# Patient Record
Sex: FEMALE | Race: WHITE | NOT HISPANIC OR LATINO | Employment: FULL TIME | URBAN - NONMETROPOLITAN AREA
[De-identification: names, ages, dates, MRNs, and addresses within clinical notes are randomized per-mention and may not be internally consistent; named-entity substitution may affect disease eponyms.]

---

## 2020-02-26 ENCOUNTER — OFFICE VISIT CONVERTED (OUTPATIENT)
Dept: FAMILY MEDICINE CLINIC | Age: 35
End: 2020-02-26
Attending: NURSE PRACTITIONER

## 2020-02-26 ENCOUNTER — HOSPITAL ENCOUNTER (OUTPATIENT)
Dept: OTHER | Facility: HOSPITAL | Age: 35
Discharge: HOME | End: 2020-02-26
Attending: NURSE PRACTITIONER

## 2020-02-26 LAB
25(OH)D3 SERPL-MCNC: 57.5 NG/ML (ref 30–100)
ALBUMIN SERPL-MCNC: 4.2 G/DL (ref 3.5–5)
ALBUMIN/GLOB SERPL: 1.8 {RATIO} (ref 1.4–2.6)
ALP SERPL-CCNC: 43 U/L (ref 42–98)
ALT SERPL-CCNC: 13 U/L (ref 10–40)
ANION GAP SERPL CALC-SCNC: 21 MMOL/L (ref 8–19)
AST SERPL-CCNC: 18 U/L (ref 15–50)
BASOPHILS # BLD MANUAL: 0.04 10*3/UL (ref 0–0.2)
BASOPHILS NFR BLD MANUAL: 0.7 % (ref 0–3)
BILIRUB SERPL-MCNC: 0.34 MG/DL (ref 0.2–1.3)
BUN SERPL-MCNC: 7 MG/DL (ref 5–25)
BUN/CREAT SERPL: 10 {RATIO} (ref 6–20)
CALCIUM SERPL-MCNC: 9.3 MG/DL (ref 8.7–10.4)
CHLORIDE SERPL-SCNC: 104 MMOL/L (ref 99–111)
CHOLEST SERPL-MCNC: 161 MG/DL (ref 107–200)
CHOLEST/HDLC SERPL: 2.6 {RATIO} (ref 3–6)
CONV CO2: 21 MMOL/L (ref 22–32)
CONV TOTAL PROTEIN: 6.5 G/DL (ref 6.3–8.2)
CREAT UR-MCNC: 0.68 MG/DL (ref 0.5–0.9)
DEPRECATED RDW RBC AUTO: 39.4 FL
EOSINOPHIL # BLD MANUAL: 0.18 10*3/UL (ref 0–0.7)
EOSINOPHIL NFR BLD MANUAL: 3.3 % (ref 0–7)
ERYTHROCYTE [DISTWIDTH] IN BLOOD BY AUTOMATED COUNT: 12 % (ref 11.5–14.5)
GFR SERPLBLD BASED ON 1.73 SQ M-ARVRAT: >60 ML/MIN/{1.73_M2}
GLOBULIN UR ELPH-MCNC: 2.3 G/DL (ref 2–3.5)
GLUCOSE SERPL-MCNC: 84 MG/DL (ref 65–99)
GRANS (ABSOLUTE): 3.48 10*3/UL (ref 2–8)
GRANS: 64.7 % (ref 30–85)
HBA1C MFR BLD: 12 G/DL (ref 12–16)
HCT VFR BLD AUTO: 35 % (ref 37–47)
HDLC SERPL-MCNC: 61 MG/DL (ref 40–60)
IMM GRANULOCYTES # BLD: 0.01 10*3/UL (ref 0–0.54)
IMM GRANULOCYTES NFR BLD: 0.2 % (ref 0–0.43)
LDLC SERPL CALC-MCNC: 88 MG/DL (ref 70–100)
LYMPHOCYTES # BLD MANUAL: 1.36 10*3/UL (ref 1–5)
LYMPHOCYTES NFR BLD MANUAL: 5.9 % (ref 3–10)
MCH RBC QN AUTO: 30.6 PG (ref 27–31)
MCHC RBC AUTO-ENTMCNC: 34.3 G/DL (ref 33–37)
MCV RBC AUTO: 89.3 FL (ref 81–99)
MONOCYTES # BLD AUTO: 0.32 10*3/UL (ref 0.2–1.2)
OSMOLALITY SERPL CALC.SUM OF ELEC: 291 MOSM/KG (ref 273–304)
PLATELET # BLD AUTO: 199 10*3/UL (ref 130–400)
PMV BLD AUTO: 10 FL (ref 7.4–10.4)
POTASSIUM SERPL-SCNC: 4.4 MMOL/L (ref 3.5–5.3)
RBC # BLD AUTO: 3.92 10*6/UL (ref 4.2–5.4)
SODIUM SERPL-SCNC: 142 MMOL/L (ref 135–147)
TRIGL SERPL-MCNC: 60 MG/DL (ref 40–150)
TSH SERPL-ACNC: 1.2 M[IU]/L (ref 0.27–4.2)
VARIANT LYMPHS NFR BLD MANUAL: 25.2 % (ref 20–45)
VLDLC SERPL-MCNC: 12 MG/DL (ref 5–37)
WBC # BLD AUTO: 5.39 10*3/UL (ref 4.8–10.8)

## 2021-05-18 NOTE — PROGRESS NOTES
Sandra Bolaños 1985     Preventive Medicine Services    Visit Date: Wed, Feb 26, 2020 8:33 am    Provider: Viktoria Castillo N.P. (Assistant: Lesly Sierra MA )    Location:         Electronically signed by Viktoria Castillo N.P. on  02/26/2020 09:14:17 AM                             Subjective:        CC: Mrs. Bolaños is a 34 year old White female.  This is her first visit to the clinic.  well check;         HPI:           Encounter for general adult medical examination without abnormal findings noted.  Her last physical exam was several years ago.  Her last menstrual period was 2/17/2020.  She performs breast self-exams occasionally.  She is current with her influenza.  She is not current with Td immunization.            With regard to the dysthymic disorder, her anxiety disorder was originally diagnosed many years ago.  reports that she has struggled with anxiety and depression for some time - currnetly trying lifestyle changes - she does have buspirone that she is able to take PRN - but reports that she does not 'feel right' when she takes so takes very sparingly  Satisfied with the current situation and does not wish to have further treatment       History of Vitamin D deficiency,  does not currently take supplements          With regard to the anemia, unspecified, the patient has known they are anemic for several months ago.  The patients has iron deficiency anemia.  History of anemia - has been unable to donate blood due to level.  She does have extremely heavy periods for 3/5 days  has not checked levels in some time     ROS:     CONSTITUTIONAL:  Negative for chills, fatigue and fever.      EYES:  Positive for use of glasses and contact lenses.   Negative for blurred vision.      E/N/T:  Negative for diminished hearing and sinus pressure.      CARDIOVASCULAR:  Negative for chest pain and pedal edema.      RESPIRATORY:  Negative for recent cough, dyspnea and frequent wheezing.      GASTROINTESTINAL:   Positive for constipation.   Negative for abdominal pain, diarrhea, nausea or vomiting.      GENITOURINARY:  Negative for dysuria and change in urine stream.      MUSCULOSKELETAL:  Negative for arthralgias, back pain, joint stiffness and myalgias.      INTEGUMENTARY/BREAST:  Negative for atypical mole(s) and rash.      NEUROLOGICAL:  Positive for headaches ( controlled on OTC ).      ENDOCRINE:  Negative for hair loss, polydipsia and polyphagia.      ALLERGIC/IMMUNOLOGIC:  Positive for seasonal allergies.      PSYCHIATRIC:  Positive for anxiety and wake up around 3 after bed at 8  then able to fall back to sleep.   Negative for depression, feelings of stress, recreational drug use or suicidal thoughts.          Past Medical History / Family History / Social History:         Last Reviewed on 2020 08:45 AM by Viktoria Castillo    Past Medical History:                 PAST MEDICAL HISTORY             GYNECOLOGICAL HISTORY:        Contraception: partner is S/P vasectomy;         PREVENTIVE HEALTH MAINTENANCE             DENTAL CLEANING: was last done  - [place]     EYE EXAM: was last done more than 2 years     MAMMOGRAM: has never been done and has no medical need for one at this time     PAP SMEAR: was last done  -  with normal results [facility]         Surgical History:     Other Surgeries    Dilation and evacuation ; Procedures: colonoscopy /Dr. Blakely normal         Family History:     Father: Hypertension;  Type 2 Diabetes     Mother: Anxiety; Depression     Sister(s): 1 sister(s) total;  Depression         Social History:     Occupation: [employer]      Marital Status:      Children: 1 child         Tobacco/Alcohol/Supplements:     Last Reviewed on 2020 08:45 AM by Viktoria Castillo    Tobacco: (15 yrs pack-year history).          Alcohol: Frequency:    RARE;     Caffeine:  She admits to consuming caffeine via coffee ( 1 serving per day ).           Substance Abuse History:     Last Reviewed on 2/26/2020 08:45 AM by Viktoria Castillo    None         Mental Health History:     Last Reviewed on 2/26/2020 08:45 AM by Viktoria Castillo        Generalized Anxiety Disorder         Communicable Diseases (eg STDs):     Last Reviewed on 2/26/2020 08:45 AM by Viktoria Castillo    Reportable health conditions; NEGATIVE         Current Problems:     Last Reviewed on 2/26/2020 08:45 AM by Viktoria Castillo    Dysthymic disorder    Vitamin D deficiency, unspecified    Anemia, unspecified    Encounter for general adult medical examination without abnormal findings        Immunizations:     zzFluzone pf-quadrivalent 3 and up 10/1/2016    Fluzone (3 + years dose) 9/27/2017    influenza, injectable, quadrivalent 10/1/2019        Allergies:     Last Reviewed on 2/26/2020 08:45 AM by Viktoria Castillo    Bactrim:          Current Medications:     Last Reviewed on 2/26/2020 08:45 AM by Viktoria Castillo    Multivitamins     busPIRone 10 mg oral tablet [take 1 tablet (10 mg) by oral route PRN ]        Objective:        Vitals:         Current: 2/26/2020 8:39:37 AM    Ht:  5 ft, 5.5 in;  Wt: 131.8 lbs;  BMI: 21.6T: 98.8 F (oral);  BP: 117/75 mm Hg (left arm, sitting);  P: 77 bpm (left arm (BP Cuff), sitting);  sCr: 0.79 mg/dL;  GFR: 94.35        Exams:     PHYSICAL EXAM:     GENERAL: vital signs recorded - well developed, well nourished;  no apparent distress;     EYES: extraocular movements intact; PERRL;     E/N/T: EARS: external auditory canal normal;  both TMs are scarred;  NOSE:  normal nasal mucosa, septum, turbinates, and sinuses; OROPHARYNX:  normal mucosa, dentition, gingiva, and posterior pharynx;     NECK: range of motion is normal;     RESPIRATORY: normal appearance and symmetric expansion of chest wall; normal respiratory rate and pattern with no distress; normal breath sounds with no rales, rhonchi, wheezes or rubs;     CARDIOVASCULAR: normal rate; rhythm is  regular;  no edema;     GASTROINTESTINAL: nontender; normal bowel sounds; no organomegaly;     LYMPHATIC: no enlargement of cervical or facial nodes; no supraclavicular nodes;     MUSCULOSKELETAL: normal gait; normal range of motion of all major muscle groups; no limb or joint pain with range of motion;     NEUROLOGIC: mental status: alert and oriented x 3;     PSYCHIATRIC: appropriate affect and demeanor; normal speech pattern; normal thought and perception;         Assessment:         Z00.00   Encounter for general adult medical examination without abnormal findings       F34.1   Dysthymic disorder       E55.9   Vitamin D deficiency, unspecified       D64.9   Anemia, unspecified           ORDERS:         Lab Orders:       [id number]  VITD - [facility] Vitamin D, 25 Hydroxy  (Send-Out)            [id number]  [facility] PHYSICAL: CMP, CBC, TSH, LIPID: [id number], [id number]  (Send-Out)              Other Orders:         Depression screen negative  (In-House)                      Plan:         Encounter for general adult medical examination without abnormal findings    LABORATORY:  Labs ordered to be performed today include PHYSICAL PANEL; CMP, CBC, TSH, LIPID.      RECOMMENDATIONS given include: recommend annual eye exam - we will get labs.  MIPS PHQ-9 Depression Screening: Completed form scanned and in chart; Total Score 2; Negative Depression Screen           Orders:       [id number]  [facility] PHYSICAL: CMP, CBC, TSH, LIPID: [id number], [id number]  (Send-Out)              Depression screen negative  (In-House)              Dysthymic disorderShe will continue to take the buspirone PRN and wishes to discontinue eventually - will follow up PRN        Vitamin D deficiency, unspecified    LABORATORY:  Labs ordered to be performed today include Vitamin D.            Orders:       [id number]  VITD - [facility] Vitamin D, 25 Hydroxy  (Send-Out)              Anemia, unspecifiedWill check CBC - may need to add on iron panel             Patient Recommendations:        For  Encounter for general adult medical examination without abnormal findings:    I also recommend recommend annual eye exam - we will get labs.              Charge Capture:         Primary Diagnosis:     Z00.00  Encounter for general adult medical examination without abnormal findings           Orders:      [id number]  Preventive medicine, established patient, age 18-39 years  (In-House)              Depression screen negative  (In-House)              F34.1  Dysthymic disorder     E55.9  Vitamin D deficiency, unspecified     D64.9  Anemia, unspecified

## 2021-07-02 VITALS
HEART RATE: 77 BPM | TEMPERATURE: 98.8 F | DIASTOLIC BLOOD PRESSURE: 75 MMHG | BODY MASS INDEX: 21.18 KG/M2 | WEIGHT: 131.8 LBS | HEIGHT: 66 IN | SYSTOLIC BLOOD PRESSURE: 117 MMHG

## 2021-07-12 ENCOUNTER — OFFICE VISIT (OUTPATIENT)
Dept: FAMILY MEDICINE CLINIC | Age: 36
End: 2021-07-12

## 2021-07-12 VITALS
HEART RATE: 80 BPM | BODY MASS INDEX: 20.56 KG/M2 | DIASTOLIC BLOOD PRESSURE: 65 MMHG | SYSTOLIC BLOOD PRESSURE: 113 MMHG | HEIGHT: 67 IN | WEIGHT: 131 LBS

## 2021-07-12 DIAGNOSIS — J06.9 ACUTE URI: Primary | ICD-10-CM

## 2021-07-12 PROCEDURE — 99213 OFFICE O/P EST LOW 20 MIN: CPT | Performed by: NURSE PRACTITIONER

## 2021-07-12 RX ORDER — MULTIPLE VITAMINS W/ MINERALS TAB 9MG-400MCG
1 TAB ORAL DAILY
COMMUNITY

## 2021-07-12 RX ORDER — FLUTICASONE PROPIONATE 50 MCG
2 SPRAY, SUSPENSION (ML) NASAL DAILY
Qty: 15.8 ML | Refills: 1 | Status: SHIPPED | OUTPATIENT
Start: 2021-07-12 | End: 2021-09-30

## 2021-07-12 RX ORDER — CETIRIZINE HYDROCHLORIDE 10 MG/1
10 TABLET ORAL DAILY
Qty: 30 TABLET | Refills: 1 | Status: SHIPPED | OUTPATIENT
Start: 2021-07-12 | End: 2022-09-12

## 2021-07-12 NOTE — PATIENT INSTRUCTIONS

## 2021-07-12 NOTE — PROGRESS NOTES
"Chief Complaint  Nasal Congestion and Headache    Subjective          Sandra Bolaños presents to [facility]  Upper Respiratory Infection  Patient complains of symptoms of a URI. Symptoms include cough described as nonproductive, headache described as pressure-like, nasal congestion, no  fever, sneezing and sore throat. Onset of symptoms was 7 days ago, and has been rapidly worsening since that time. Treatment to date: antihistamines and Dayquil/Nightquil and [facility] [place].  No sick contacts.  Have been vaccinated against COVID.  Denies N/V.                      Objective   Vital Signs:   /65 (BP Location: Right arm, Patient Position: Sitting)   Pulse 80   Ht 170.2 cm (67\")   Wt 59.4 kg (131 lb)   BMI 20.52 kg/m²     Physical Exam  Constitutional:       Appearance: Normal appearance. She is normal weight.   HENT:      Head: Normocephalic.      Comments: Slight right frontal sinus tenderness     Right Ear: Tympanic membrane, ear canal and external ear normal.      Left Ear: Tympanic membrane, ear canal and external ear normal.      Nose: Nose normal.      Mouth/Throat:      Mouth: Mucous membranes are moist.      Pharynx: Oropharynx is clear. No oropharyngeal exudate or posterior oropharyngeal erythema.   Eyes:      Conjunctiva/sclera: Conjunctivae normal.      Pupils: Pupils are equal, round, and reactive to light.   Cardiovascular:      Rate and Rhythm: Normal rate and regular rhythm.      Pulses: Normal pulses.      Heart sounds: Normal heart sounds.   Pulmonary:      Effort: Pulmonary effort is normal.      Breath sounds: Normal breath sounds.   Musculoskeletal:      Cervical back: Normal range of motion.   Neurological:      Mental Status: She is alert and oriented to person, place, and time.   Psychiatric:         Mood and Affect: Mood normal.         Behavior: Behavior normal.         Thought Content: Thought content normal.        Result Review :               "   Assessment and Plan    Diagnoses and all orders for this visit:    1. Acute URI (Primary)  -     fluticasone (Flonase) 50 MCG/ACT nasal spray; 2 sprays into the nostril(s) as directed by provider Daily.  Dispense: 15.8 mL; Refill: 1  -     cetirizine (zyrTEC) 10 MG tablet; Take 1 tablet by mouth Daily for 30 days.  Dispense: 30 tablet; Refill: 1    Her symptoms tend to point towards allergic rhinitis, but she does have some slight right frontal sinus tenderness and headaches.  We will try conservative treatment, and if she doesn't improve, we may consider a antibiotic.    Follow Up   Return if symptoms worsen or fail to improve.  Patient was given instructions and counseling regarding her condition or for health maintenance advice. Please see specific information pulled into the AVS if appropriate.

## 2021-09-30 DIAGNOSIS — J06.9 ACUTE URI: ICD-10-CM

## 2021-09-30 RX ORDER — FLUTICASONE PROPIONATE 50 MCG
SPRAY, SUSPENSION (ML) NASAL
Qty: 16 G | Refills: 1 | Status: SHIPPED | OUTPATIENT
Start: 2021-09-30

## 2022-09-12 ENCOUNTER — OFFICE VISIT (OUTPATIENT)
Dept: FAMILY MEDICINE CLINIC | Age: 37
End: 2022-09-12

## 2022-09-12 ENCOUNTER — HOSPITAL ENCOUNTER (OUTPATIENT)
Dept: GENERAL RADIOLOGY | Facility: HOSPITAL | Age: 37
Discharge: HOME | End: 2022-09-12

## 2022-09-12 VITALS
HEART RATE: 73 BPM | TEMPERATURE: 98.3 F | BODY MASS INDEX: 22.29 KG/M2 | SYSTOLIC BLOOD PRESSURE: 123 MMHG | WEIGHT: 142 LBS | DIASTOLIC BLOOD PRESSURE: 85 MMHG | HEIGHT: 67 IN

## 2022-09-12 DIAGNOSIS — M54.41 ACUTE MIDLINE LOW BACK PAIN WITH RIGHT-SIDED SCIATICA: ICD-10-CM

## 2022-09-12 DIAGNOSIS — Q76.0 SPINA BIFIDA OCCULTA: ICD-10-CM

## 2022-09-12 DIAGNOSIS — M54.41 ACUTE MIDLINE LOW BACK PAIN WITH RIGHT-SIDED SCIATICA: Primary | ICD-10-CM

## 2022-09-12 PROCEDURE — 99213 OFFICE O/P EST LOW 20 MIN: CPT | Performed by: PHYSICIAN ASSISTANT

## 2022-09-12 PROCEDURE — 96372 THER/PROPH/DIAG INJ SC/IM: CPT | Performed by: PHYSICIAN ASSISTANT

## 2022-09-12 PROCEDURE — 72100 X-RAY EXAM L-S SPINE 2/3 VWS: CPT

## 2022-09-12 RX ORDER — KETOROLAC TROMETHAMINE 30 MG/ML
30 INJECTION, SOLUTION INTRAMUSCULAR; INTRAVENOUS ONCE
Status: COMPLETED | OUTPATIENT
Start: 2022-09-12 | End: 2022-09-12

## 2022-09-12 RX ORDER — CYCLOBENZAPRINE HCL 5 MG
5 TABLET ORAL 2 TIMES DAILY PRN
Qty: 6 TABLET | Refills: 0 | Status: SHIPPED | OUTPATIENT
Start: 2022-09-12 | End: 2022-09-15

## 2022-09-12 RX ADMIN — KETOROLAC TROMETHAMINE 30 MG: 30 INJECTION, SOLUTION INTRAMUSCULAR; INTRAVENOUS at 12:37

## 2022-09-12 NOTE — PROGRESS NOTES
Subjective     CHIEF COMPLAINT    Chief Complaint   Patient presents with   • Back Pain     Patient said it went out while squatting down and twisting            This is a 36-year-old female presenting to the clinic complaining of back pain since yesterday.  She was bending down to feed her cat, twisted to the left and had sudden onset of right-sided back pain.  She has difficulty with movement secondary to this pain but does not feel numbness, tingling, or incontinence.  She has had similar symptoms in the past and has seen chiropractors before for this.  Most recently, about 1 year ago, she states that a chiropractor told her she had spina bifida occulta after looking at her x-ray.  She has been trying ice and TENS unit for the episode today which has improved her pain somewhat.  She is mainly wondering if there is something more insidious going on especially with the questionable spina bifida and/or if there are things she could be doing to prevent these episodes.    Back Pain  This is a recurrent problem. The current episode started yesterday. The problem occurs rarely. The problem has been gradually improving since onset. The pain is present in the lumbar spine. The quality of the pain is described as stabbing and aching. The pain radiates to the right thigh. The pain is moderate. The symptoms are aggravated by lying down, sitting and standing. Pertinent negatives include no bladder incontinence, bowel incontinence, dysuria, fever, numbness, paresis, paresthesias, perianal numbness, tingling, weakness or weight loss. She has tried ice, chiropractic manipulation and NSAIDs (TENS) for the symptoms. The treatment provided moderate relief.             Review of Systems   Constitutional: Negative for chills, fever and weight loss.   Gastrointestinal: Negative for bowel incontinence.   Genitourinary: Negative for bladder incontinence, difficulty urinating and dysuria.   Musculoskeletal: Positive for back pain.  "Negative for arthralgias, gait problem, joint swelling and myalgias.   Skin: Negative for wound.   Neurological: Negative for tingling, weakness, numbness and paresthesias.            History reviewed. No pertinent past medical history.         History reviewed. No pertinent surgical history.         History reviewed. No pertinent family history.         Social History     Socioeconomic History   • Marital status:    Tobacco Use   • Smoking status: Never Smoker   • Smokeless tobacco: Never Used   Substance and Sexual Activity   • Alcohol use: Yes     Comment: socailly            Allergies   Allergen Reactions   • Bactrim [Sulfamethoxazole-Trimethoprim] Hives            Current Outpatient Medications on File Prior to Visit   Medication Sig Dispense Refill   • cetirizine (zyrTEC) 10 MG tablet Take 1 tablet by mouth Daily for 30 days. 30 tablet 1   • fluticasone (FLONASE) 50 MCG/ACT nasal spray USE 2 SPRAYS IN EACH NOSTRIL EVERY DAY 16 g 1   • multivitamin with minerals tablet tablet Take 1 tablet by mouth Daily.       No current facility-administered medications on file prior to visit.            /85 (BP Location: Left arm, Patient Position: Sitting)   Pulse 73   Temp 98.3 °F (36.8 °C) (Oral)   Ht 170.2 cm (67\")   Wt 64.4 kg (142 lb)   BMI 22.24 kg/m²          Objective     Physical Exam  Vitals and nursing note reviewed.   Constitutional:       General: She is not in acute distress.     Appearance: Normal appearance.   Pulmonary:      Effort: Pulmonary effort is normal. No respiratory distress.   Musculoskeletal:      Lumbar back: No swelling, deformity, spasms, tenderness or bony tenderness. Decreased range of motion.   Skin:     General: Skin is warm and dry.      Findings: No bruising or erythema.   Neurological:      Mental Status: She is alert and oriented to person, place, and time.      Sensory: Sensation is intact.      Motor: Weakness (4/5 L hip flexion; all other LE movements 5/5) present. " No atrophy.      Gait: Gait is intact.      Deep Tendon Reflexes:      Reflex Scores:       Patellar reflexes are 2+ on the right side and 2+ on the left side.  Psychiatric:         Mood and Affect: Mood normal.         Behavior: Behavior normal.              Procedures                    Lab Results (last 24 hours)     ** No results found for the last 24 hours. **                XR Spine Lumbar 2 or 3 View    Result Date: 9/12/2022  PROCEDURE: XR SPINE LUMBAR 2 OR 3 VW  COMPARISON: [facility] ALTAGRACIA JONES, LS-SPINE COMPL W/OBLIQUE, 6/02/2014, 12:51.  INDICATIONS: LOW BACK PAIN  FINDINGS:  No fracture of the lumbar spine.  No significant malalignment is seen.  SI joints appear unremarkable.  Normal bowel gas pattern is seen.  Stable spina bifida occulta L5 of doubtful clinical significance and unchanged compared to 6/2/2014.        1. No acute osseous abnormality of the lumbar spine.     ROHITH WINKLER MD       Electronically Signed and Approved By: ROHITH WINKLER MD on 9/12/2022 at 14:43                               Diagnoses and all orders for this visit:    1. Acute midline low back pain with right-sided sciatica (Primary)  -     XR Spine Lumbar 2 or 3 View; Future  -     Ambulatory Referral to Physical Therapy Evaluate and treat  -     cyclobenzaprine (FLEXERIL) 5 MG tablet; Take 1 tablet by mouth 2 (Two) Times a Day As Needed for Muscle Spasms for up to 3 days.  Dispense: 6 tablet; Refill: 0  -     ketorolac (TORADOL) injection 30 mg    2. Spina bifida occulta  Comments:  Treatment as above, does not seem like surgical candidate.  Offered referral to spine specialist should patient wish for second opinion.             Additional Instructions for the Follow-ups that You Need to Schedule     Ambulatory Referral to Physical Therapy Evaluate and treat   As directed      Specialty needed: Evaluate and treat    Follow-up needed: Yes                         FOR FULL DISCHARGE  INSTRUCTIONS/COMMENTS/HANDOUTS please see the AVS

## 2024-03-01 NOTE — PROGRESS NOTES
"Chief Complaint  Annual Exam (Discuss anxiety medication.)    Subjective          Sandra Bolaños presents to [facility]  History of Present Illness  She is here for a physical.    She was pregnant in 2018 and possibly had the tetanus at that time.  She has had 2 doses of the COVID-vaccine; she's not interested in the booster at this time. Last PAP 03/21/2023 through [facility]. She does go to Dr. Jeff for her denistry. She does wear her seat belt. She is physically active, but not currently due to dizziness. She drinks a lot of water daily.     She has been having dizzy spells ever since she was sick a few weeks ago.  She saw Brissa Delgadillo, who recommended further evaluation to the ED.  She went to the ED and was given IV fluids and it was found that her potassium was low.  She was sent in a potassium supplement, but has not been able to pick it up yet.  She was also given a prescription of meclizine, which she has not been able to .  Her ED workup did include EKG and orthostatic blood pressure, which she reports was normal.  The ED physician believed her dizziness may have been to BPPV . She reports getting hot with these spells. She reports feeling pre-syncope.  She has been having a lot of anxiety since the spells have taken place.  She would to start medication for anxiety.  She was on Wellbutrin in the past, which was not helpful.            Objective   Vital Signs:   /63 (BP Location: Left arm, Patient Position: Sitting)   Pulse 89   Ht 170.2 cm (67.01\")   Wt 65.6 kg (144 lb 9.6 oz)   BMI 22.64 kg/m²     Physical Exam  Constitutional:       General: She is not in acute distress.     Appearance: Normal appearance. She is well-developed and normal weight.   HENT:      Head: Normocephalic.      Right Ear: Tympanic membrane, ear canal and external ear normal.      Left Ear: Tympanic membrane, ear canal and external ear normal. A middle ear effusion is " present. Tympanic membrane is scarred.      Mouth/Throat:      Mouth: Mucous membranes are moist.      Pharynx: Oropharynx is clear. No oropharyngeal exudate or posterior oropharyngeal erythema.   Eyes:      Extraocular Movements: Extraocular movements intact.      Conjunctiva/sclera: Conjunctivae normal.      Pupils: Pupils are equal, round, and reactive to light.      Visual Fields: Right eye visual fields normal and left eye visual fields normal.   Neck:      Thyroid: No thyromegaly or thyroid tenderness.   Cardiovascular:      Rate and Rhythm: Normal rate and regular rhythm.      Heart sounds: Normal heart sounds.   Pulmonary:      Effort: Pulmonary effort is normal. No retractions.      Breath sounds: Normal breath sounds and air entry.   Abdominal:      General: Abdomen is flat. Bowel sounds are normal. There is no distension.      Palpations: Abdomen is soft. There is no mass.      Tenderness: There is no abdominal tenderness.   Musculoskeletal:         General: Normal range of motion.      Cervical back: Normal range of motion and neck supple.      Right lower leg: No edema.      Left lower leg: No edema.   Lymphadenopathy:      Cervical: No cervical adenopathy.   Skin:     General: Skin is warm and dry.   Neurological:      Mental Status: She is alert and oriented to person, place, and time.      Gait: Gait normal.   Psychiatric:         Mood and Affect: Mood and affect normal.         Behavior: Behavior normal.         Thought Content: Thought content normal.        Result Review :   The following data was reviewed by: COURT Pablo on 03/08/2024:  Urinalysis, Reflex Microscopic and Culture If Indicated (03/06/2024 16:22)  MAGNESIUM (03/06/2024 16:22)  COMPREHENSIVE METABOLIC PANEL (03/06/2024 16:22)  CBC with Auto Diff (03/06/2024 16:22)  Pregnancy, Urine - (03/06/2024 16:23)  T4 (03/06/2024 16:23)  TSH (03/06/2024 16:23)  High Sensitivity Troponin I (03/06/2024 16:23)                 Assessment  and Plan    Diagnoses and all orders for this visit:    1. Annual physical exam (Primary)  -     Comprehensive Metabolic Panel; Future  -     Lipid Panel; Future  -     Hemoglobin A1c; Future  -     Hepatitis C Antibody; Future    2. Preventative health care  -     Comprehensive Metabolic Panel; Future  -     Lipid Panel; Future  -     Hemoglobin A1c; Future  -     Hepatitis C Antibody; Future    3. Screening for diabetes mellitus (DM)  -     Hemoglobin A1c; Future    4. Screening for cholesterol level  -     Lipid Panel; Future    5. Need for hepatitis C screening test  -     Hepatitis C Antibody; Future    6. Anxiety  Assessment & Plan:  We have discussed various options for anxiety including SSRIs versus buspirone.  She is interested in starting buspirone.  Will reevaluate her in 6 weeks to see if buspirone was effective.    Orders:  -     busPIRone (BUSPAR) 5 MG tablet; Take 1 tablet by mouth 3 (Three) Times a Day.  Dispense: 90 tablet; Refill: 1    7. Encounter for screening mammogram for malignant neoplasm of breast  -     Mammo Screening Digital Tomosynthesis Bilateral With CAD; Future    8. Dizziness  Comments:  She's going to  her meclizine today. Eply maneuver handouts given. If no improvement, may consider ENT referral.          Follow Up   Return in about 6 weeks (around 4/19/2024).  Patient was given instructions and counseling regarding her condition or for health maintenance advice. Please see specific information pulled into the AVS if appropriate.

## 2024-03-06 ENCOUNTER — OFFICE VISIT (OUTPATIENT)
Dept: FAMILY MEDICINE CLINIC | Age: 39
End: 2024-03-06
Payer: COMMERCIAL

## 2024-03-06 VITALS
HEART RATE: 93 BPM | HEIGHT: 67 IN | SYSTOLIC BLOOD PRESSURE: 130 MMHG | TEMPERATURE: 98.3 F | WEIGHT: 146.4 LBS | OXYGEN SATURATION: 99 % | BODY MASS INDEX: 22.98 KG/M2 | DIASTOLIC BLOOD PRESSURE: 80 MMHG

## 2024-03-06 DIAGNOSIS — R42 DIZZINESS: Primary | ICD-10-CM

## 2024-03-06 PROCEDURE — 99214 OFFICE O/P EST MOD 30 MIN: CPT | Performed by: NURSE PRACTITIONER

## 2024-03-06 RX ORDER — MECLIZINE HCL 12.5 MG/1
12.5 TABLET ORAL 3 TIMES DAILY PRN
Qty: 30 TABLET | Refills: 0 | Status: SHIPPED | OUTPATIENT
Start: 2024-03-06 | End: 2024-03-08

## 2024-03-06 NOTE — PROGRESS NOTES
Chief Complaint  Sandra Bolaños presents to [facility] for Dizziness (Had stomach virus 1.5 week ago, and has since had dizziness and feels like she could pass out since)    Subjective          History of Present Illness    Sandra is here today with c/o dizziness that started about one week ago. She had stomach bug prior to symptoms starting. Vomiting resolved. Diarrhea improved. Symptoms worse when turning head. She reports feeling like she is going to pass out.     Review of Systems      Allergies   Allergen Reactions    Bactrim [Sulfamethoxazole-Trimethoprim] Hives      Past Medical History:   Diagnosis Date    Allergic     Anemia     Anxiety     Depression     Headache      Current Outpatient Medications   Medication Sig Dispense Refill    cetirizine (zyrTEC) 10 MG tablet Take 1 tablet by mouth Daily for 30 days. 30 tablet 1    fluticasone (FLONASE) 50 MCG/ACT nasal spray USE 2 SPRAYS IN EACH NOSTRIL EVERY DAY 16 g 1    multivitamin with minerals tablet tablet Take 1 tablet by mouth Daily.      meclizine (ANTIVERT) 12.5 MG tablet Take 1 tablet by mouth 3 (Three) Times a Day As Needed for Dizziness. 30 tablet 0     No current facility-administered medications for this visit.     Past Surgical History:   Procedure Laterality Date    BREAST SURGERY  Oct 2020    COLONOSCOPY        Social History     Tobacco Use    Smoking status: Former     Current packs/day: 0.00     Types: Cigarettes     Quit date: 2021     Years since quittin.3    Smokeless tobacco: Never   Vaping Use    Vaping status: Never Used   Substance Use Topics    Alcohol use: Yes     Alcohol/week: 4.0 - 6.0 standard drinks of alcohol     Types: 2 Glasses of wine, 2 - 4 Cans of beer per week     Comment: socailly    Drug use: Never     Family History   Problem Relation Age of Onset    Anxiety disorder Mother     Depression Mother     Diabetes Father     Depression Sister      Health Maintenance Due   Topic Date  "Due    TDAP/TD VACCINES (1 - Tdap) Never done    HEPATITIS C SCREENING  Never done    ANNUAL PHYSICAL  Never done    PAP SMEAR  Never done      Immunization History   Administered Date(s) Administered    COVID-19 (PFIZER) Purple Cap Monovalent 03/19/2021, 04/10/2021    Fluzone (or Fluarix & Flulaval for VFC) >6mos 10/10/2022    Influenza MDCK Quadrivalent with Preserative 10/11/2021    Influenza Seasonal Injectable 09/27/2017    Rho (D) Immune Globulin 05/14/2018        Objective     Vitals:    03/06/24 1406 03/06/24 1431   BP: 138/85 130/80   BP Location: Left arm    Patient Position: Sitting    Cuff Size: Adult    Pulse: 93    Temp: 98.3 °F (36.8 °C)    TempSrc: Oral    SpO2: 99%    Weight: 66.4 kg (146 lb 6.4 oz)    Height: 170.2 cm (67.01\")      Body mass index is 22.92 kg/m².     BMI is within normal parameters. No other follow-up for BMI required.       Physical Exam  Vitals reviewed.   Constitutional:       General: She is not in acute distress.     Appearance: Normal appearance. She is well-developed.   HENT:      Head: Normocephalic and atraumatic.      Right Ear: Tympanic membrane and ear canal normal.      Left Ear: Tympanic membrane and ear canal normal.   Cardiovascular:      Rate and Rhythm: Normal rate and regular rhythm.   Pulmonary:      Effort: Pulmonary effort is normal.      Breath sounds: Normal breath sounds.   Neurological:      Mental Status: She is alert and oriented to person, place, and time.      Comments: +dizziness and reports feeling like going to pass out   Psychiatric:         Mood and Affect: Mood and affect normal.           Result Review :                               Assessment and Plan      Diagnoses and all orders for this visit:    1. Dizziness (Primary)  -     meclizine (ANTIVERT) 12.5 MG tablet; Take 1 tablet by mouth 3 (Three) Times a Day As Needed for Dizziness.  Dispense: 30 tablet; Refill: 0      Patient became extremely dizzy with feeling like going to pass out during " exam. With symptoms, recommend that she proceed to the ER to likely receive IVFs and receive results in a quicker turn around. Rx meclizine was sent to the pharmacy as well. Advised increased fluids.  Ross here and verbalized that he would take her to the ER.         Follow Up     Return for As needed for persistent or worsening symptoms.

## 2024-03-08 ENCOUNTER — OFFICE VISIT (OUTPATIENT)
Dept: FAMILY MEDICINE CLINIC | Age: 39
End: 2024-03-08
Payer: COMMERCIAL

## 2024-03-08 VITALS
DIASTOLIC BLOOD PRESSURE: 63 MMHG | SYSTOLIC BLOOD PRESSURE: 125 MMHG | BODY MASS INDEX: 22.7 KG/M2 | HEART RATE: 89 BPM | HEIGHT: 67 IN | WEIGHT: 144.6 LBS

## 2024-03-08 DIAGNOSIS — Z11.59 NEED FOR HEPATITIS C SCREENING TEST: ICD-10-CM

## 2024-03-08 DIAGNOSIS — R42 DIZZINESS: ICD-10-CM

## 2024-03-08 DIAGNOSIS — Z13.1 SCREENING FOR DIABETES MELLITUS (DM): ICD-10-CM

## 2024-03-08 DIAGNOSIS — F41.9 ANXIETY: ICD-10-CM

## 2024-03-08 DIAGNOSIS — Z00.00 PREVENTATIVE HEALTH CARE: ICD-10-CM

## 2024-03-08 DIAGNOSIS — Z13.220 SCREENING FOR CHOLESTEROL LEVEL: ICD-10-CM

## 2024-03-08 DIAGNOSIS — Z00.00 ANNUAL PHYSICAL EXAM: Primary | ICD-10-CM

## 2024-03-08 DIAGNOSIS — Z12.31 ENCOUNTER FOR SCREENING MAMMOGRAM FOR MALIGNANT NEOPLASM OF BREAST: ICD-10-CM

## 2024-03-08 RX ORDER — POTASSIUM CHLORIDE 20 MEQ/1
20 TABLET, EXTENDED RELEASE ORAL
COMMUNITY
Start: 2024-03-06 | End: 2024-03-08

## 2024-03-08 RX ORDER — BUSPIRONE HYDROCHLORIDE 5 MG/1
5 TABLET ORAL 3 TIMES DAILY
Qty: 90 TABLET | Refills: 1 | Status: SHIPPED | OUTPATIENT
Start: 2024-03-08

## 2024-03-08 NOTE — ASSESSMENT & PLAN NOTE
We have discussed various options for anxiety including SSRIs versus buspirone.  She is interested in starting buspirone.  Will reevaluate her in 6 weeks to see if buspirone was effective.

## 2024-03-15 DIAGNOSIS — R42 DIZZINESS: ICD-10-CM

## 2024-03-15 RX ORDER — MECLIZINE HCL 12.5 MG/1
TABLET ORAL
Qty: 30 TABLET | Refills: 0 | OUTPATIENT
Start: 2024-03-15

## 2024-03-18 DIAGNOSIS — R42 DIZZINESS: ICD-10-CM

## 2024-03-18 RX ORDER — MECLIZINE HCL 12.5 MG/1
TABLET ORAL
Qty: 30 TABLET | Refills: 0 | OUTPATIENT
Start: 2024-03-18

## 2024-03-20 ENCOUNTER — TELEPHONE (OUTPATIENT)
Dept: FAMILY MEDICINE CLINIC | Age: 39
End: 2024-03-20
Payer: COMMERCIAL

## 2024-03-20 NOTE — TELEPHONE ENCOUNTER
Pt inf she is due for lab work so that we can complete her physical form, pt stated she would be in tomorrow am to complete.

## 2024-03-21 ENCOUNTER — LAB (OUTPATIENT)
Dept: LAB | Facility: HOSPITAL | Age: 39
End: 2024-03-21
Payer: COMMERCIAL

## 2024-03-21 DIAGNOSIS — Z00.00 ANNUAL PHYSICAL EXAM: ICD-10-CM

## 2024-03-21 DIAGNOSIS — Z11.59 NEED FOR HEPATITIS C SCREENING TEST: ICD-10-CM

## 2024-03-21 DIAGNOSIS — Z13.1 SCREENING FOR DIABETES MELLITUS (DM): ICD-10-CM

## 2024-03-21 DIAGNOSIS — Z00.00 PREVENTATIVE HEALTH CARE: ICD-10-CM

## 2024-03-21 DIAGNOSIS — Z13.220 SCREENING FOR CHOLESTEROL LEVEL: ICD-10-CM

## 2024-03-21 LAB
ALBUMIN SERPL-MCNC: 4.4 G/DL (ref 3.5–5.2)
ALBUMIN/GLOB SERPL: 1.9 G/DL
ALP SERPL-CCNC: 61 U/L (ref 39–117)
ALT SERPL W P-5'-P-CCNC: 24 U/L (ref 1–33)
ANION GAP SERPL CALCULATED.3IONS-SCNC: 9 MMOL/L (ref 5–15)
AST SERPL-CCNC: 19 U/L (ref 1–32)
BILIRUB SERPL-MCNC: 0.5 MG/DL (ref 0–1.2)
BUN SERPL-MCNC: 11 MG/DL (ref 6–20)
BUN/CREAT SERPL: 13.6 (ref 7–25)
CALCIUM SPEC-SCNC: 9.4 MG/DL (ref 8.6–10.5)
CHLORIDE SERPL-SCNC: 104 MMOL/L (ref 98–107)
CHOLEST SERPL-MCNC: 178 MG/DL (ref 0–200)
CO2 SERPL-SCNC: 25 MMOL/L (ref 22–29)
CREAT SERPL-MCNC: 0.81 MG/DL (ref 0.57–1)
EGFRCR SERPLBLD CKD-EPI 2021: 94.8 ML/MIN/1.73
GLOBULIN UR ELPH-MCNC: 2.3 GM/DL
GLUCOSE SERPL-MCNC: 90 MG/DL (ref 65–99)
HBA1C MFR BLD: <4.3 % (ref 4.8–5.6)
HCV AB SER DONR QL: NORMAL
HDLC SERPL-MCNC: 52 MG/DL (ref 40–60)
LDLC SERPL CALC-MCNC: 110 MG/DL (ref 0–100)
LDLC/HDLC SERPL: 2.09 {RATIO}
POTASSIUM SERPL-SCNC: 4.2 MMOL/L (ref 3.5–5.2)
PROT SERPL-MCNC: 6.7 G/DL (ref 6–8.5)
SODIUM SERPL-SCNC: 138 MMOL/L (ref 136–145)
TRIGL SERPL-MCNC: 87 MG/DL (ref 0–150)
VLDLC SERPL-MCNC: 16 MG/DL (ref 5–40)

## 2024-03-21 PROCEDURE — 36415 COLL VENOUS BLD VENIPUNCTURE: CPT

## 2024-03-21 PROCEDURE — 86803 HEPATITIS C AB TEST: CPT

## 2024-03-21 PROCEDURE — 80053 COMPREHEN METABOLIC PANEL: CPT

## 2024-03-21 PROCEDURE — 80061 LIPID PANEL: CPT

## 2024-03-21 PROCEDURE — 83036 HEMOGLOBIN GLYCOSYLATED A1C: CPT

## 2024-03-22 DIAGNOSIS — E16.2 HYPOGLYCEMIA: Primary | ICD-10-CM

## 2024-03-22 NOTE — PROGRESS NOTES
Referral placed. I do recommend that she get a glucometer and check her BG when she is getting a dizzy spell. If BG low, I recommend OJ with peanut butter and crackers and see if that helps.

## 2024-04-09 NOTE — PROGRESS NOTES
"Chief Complaint  Anxiety (6w follow up/)    Subjective          Sandra Bolaños presents to [facility]  History of Present Illness  Anxiety: At last office visit, she was started on BuSpar 5 mg 3 times daily. She reports that it has helped    At her last office visit, she reported having dizzy spells.  Her A1c was found to be less than 4.3, so she has been referred to endocrinology.  She has an appointment in May. She hasn't had any more dizzy episodes. She has a glucometer, but hasn't really used it. She has been increasing protein in her diet. She is a vegetarian and has been adding fish into her diet.       Objective   Vital Signs:   /76 (BP Location: Left arm, Patient Position: Sitting)   Pulse 78   Ht 170.2 cm (67.01\")   Wt 70.6 kg (155 lb 9.6 oz)   BMI 24.36 kg/m²     Physical Exam  Constitutional:       General: She is not in acute distress.     Appearance: Normal appearance. She is normal weight.   HENT:      Head: Normocephalic.   Eyes:      Pupils: Pupils are equal, round, and reactive to light.      Visual Fields: Right eye visual fields normal and left eye visual fields normal.   Neck:      Trachea: Trachea normal.   Cardiovascular:      Rate and Rhythm: Normal rate and regular rhythm.      Heart sounds: Normal heart sounds.   Pulmonary:      Effort: Pulmonary effort is normal.      Breath sounds: Normal breath sounds and air entry.   Musculoskeletal:      Right lower leg: No edema.      Left lower leg: No edema.   Skin:     General: Skin is warm and dry.   Neurological:      Mental Status: She is alert and oriented to person, place, and time.   Psychiatric:         Mood and Affect: Mood and affect normal.         Behavior: Behavior normal.         Thought Content: Thought content normal.        Result Review :   The following data was reviewed by: COURT Pablo on 04/19/2024:                  Assessment and Plan    Diagnoses and all orders for this " visit:    1. Anxiety (Primary)  Assessment & Plan:  Controlled with Buspar 5 mg TID.    Orders:  -     busPIRone (BUSPAR) 5 MG tablet; Take 1 tablet by mouth 3 (Three) Times a Day.  Dispense: 270 tablet; Refill: 3    2. Dizziness  Assessment & Plan:  Dizzy spells have resolved.  Due to being a vegetarian, I am going to check a B12.  She is to keep her follow-up appointment with Endo due to her hypoglycemia.    Orders:  -     Vitamin B12; Future          Follow Up   Return in about 1 year (around 4/19/2025) for Annual physical.  Patient was given instructions and counseling regarding her condition or for health maintenance advice. Please see specific information pulled into the AVS if appropriate.

## 2024-04-19 ENCOUNTER — OFFICE VISIT (OUTPATIENT)
Dept: FAMILY MEDICINE CLINIC | Age: 39
End: 2024-04-19
Payer: COMMERCIAL

## 2024-04-19 VITALS
SYSTOLIC BLOOD PRESSURE: 119 MMHG | HEIGHT: 67 IN | BODY MASS INDEX: 24.42 KG/M2 | DIASTOLIC BLOOD PRESSURE: 76 MMHG | HEART RATE: 78 BPM | WEIGHT: 155.6 LBS

## 2024-04-19 DIAGNOSIS — R42 DIZZINESS: ICD-10-CM

## 2024-04-19 DIAGNOSIS — F41.9 ANXIETY: Primary | ICD-10-CM

## 2024-04-19 PROCEDURE — 99213 OFFICE O/P EST LOW 20 MIN: CPT | Performed by: NURSE PRACTITIONER

## 2024-04-19 RX ORDER — BUSPIRONE HYDROCHLORIDE 5 MG/1
5 TABLET ORAL 3 TIMES DAILY
Qty: 270 TABLET | Refills: 3 | Status: SHIPPED | OUTPATIENT
Start: 2024-04-19

## 2024-04-19 NOTE — ASSESSMENT & PLAN NOTE
Dizzy spells have resolved.  Due to being a vegetarian, I am going to check a B12.  She is to keep her follow-up appointment with Kayla due to her hypoglycemia.

## 2024-05-16 ENCOUNTER — OFFICE VISIT (OUTPATIENT)
Dept: ENDOCRINOLOGY | Age: 39
End: 2024-05-16
Payer: COMMERCIAL

## 2024-05-16 VITALS
DIASTOLIC BLOOD PRESSURE: 72 MMHG | SYSTOLIC BLOOD PRESSURE: 108 MMHG | BODY MASS INDEX: 24.17 KG/M2 | WEIGHT: 154 LBS | HEART RATE: 72 BPM | OXYGEN SATURATION: 100 % | HEIGHT: 67 IN

## 2024-05-16 DIAGNOSIS — E16.2 HYPOGLYCEMIA: Primary | ICD-10-CM

## 2024-05-16 PROCEDURE — 99204 OFFICE O/P NEW MOD 45 MIN: CPT | Performed by: STUDENT IN AN ORGANIZED HEALTH CARE EDUCATION/TRAINING PROGRAM

## 2024-05-16 NOTE — PROGRESS NOTES
"Chief Complaint/ Reason for consult:    Hypoglycemia      History of Present Illness    She had an ED visit in March due to dizziness and generalized weakness.  States that she had vomiting and loose stool for 2 days and following that started feeling weak  Evaluated in the ED and noted to have low potassium  Glucose was 106  She is a vegetarian  States after her ED visit made some changes in her diet and since then has been feeling much better  During her follow-up appointment with her PCP hemoglobin A1c was checked which was noted to be less than 4.3%  She never been diagnosed with diabetes, prediabetes or gestational diabetes  Has family history of type 2 diabetes  Does not check her blood sugars    Reports  heavy menstrual bleedings      Objective   Vital Signs:  /72 (BP Location: Left arm, Patient Position: Sitting, Cuff Size: Adult)   Pulse 72   Ht 170.2 cm (67.01\")   Wt 69.9 kg (154 lb)   SpO2 100%   BMI 24.11 kg/m²   Estimated body mass index is 24.11 kg/m² as calculated from the following:    Height as of this encounter: 170.2 cm (67.01\").    Weight as of this encounter: 69.9 kg (154 lb).       BMI is within normal parameters. No other follow-up for BMI required.      Review of Systems   Constitutional:  Negative for unexpected weight change.   Eyes:  Negative for visual disturbance.   Cardiovascular:  Negative for palpitations.   Gastrointestinal:  Negative for abdominal pain.   Neurological:  Negative for dizziness and light-headedness.        Physical Exam  Constitutional:       Appearance: Normal appearance.   HENT:      Head: Normocephalic and atraumatic.   Eyes:      Extraocular Movements: Extraocular movements intact.   Cardiovascular:      Rate and Rhythm: Normal rate and regular rhythm.   Pulmonary:      Effort: Pulmonary effort is normal.      Breath sounds: Normal breath sounds. No wheezing.   Abdominal:      Palpations: Abdomen is soft.      Tenderness: There is no abdominal " "tenderness.   Musculoskeletal:         General: No swelling. Normal range of motion.      Cervical back: Neck supple. No tenderness.   Neurological:      General: No focal deficit present.      Mental Status: She is alert and oriented to person, place, and time.   Psychiatric:         Mood and Affect: Mood normal.        Result Review :  The following data was reviewed by: Mahrokh Nokhbehzaeim, MD on 05/16/2024:  CMP          3/21/2024    08:11   CMP   Glucose 90    BUN 11    Creatinine 0.81    EGFR 94.8    Sodium 138    Potassium 4.2    Chloride 104    Calcium 9.4    Total Protein 6.7    Albumin 4.4    Globulin 2.3    Total Bilirubin 0.5    Alkaline Phosphatase 61    AST (SGOT) 19    ALT (SGPT) 24    Albumin/Globulin Ratio 1.9    BUN/Creatinine Ratio 13.6    Anion Gap 9.0      TSH          3/6/2024    16:23   TSH   TSH 1.847          Details          This result is from an external source.             CMP          3/21/2024    08:11   CMP   Glucose 90    BUN 11    Creatinine 0.81    EGFR 94.8    Sodium 138    Potassium 4.2    Chloride 104    Calcium 9.4    Total Protein 6.7    Albumin 4.4    Globulin 2.3    Total Bilirubin 0.5    Alkaline Phosphatase 61    AST (SGOT) 19    ALT (SGPT) 24    Albumin/Globulin Ratio 1.9    BUN/Creatinine Ratio 13.6    Anion Gap 9.0       Most Recent A1C          3/21/2024    08:11   HGBA1C Most Recent   Hemoglobin A1C <4.30          TSH          3/6/2024    16:23   TSH   TSH 1.847          Details          This result is from an external source.              No results found for: \"FREET4\"   No results found for: \"T3FREE\"   Data reviewed : Reviewed ED note  Recent Outside labs done at [facility] initiated reviewed           Assessment and Plan   Diagnoses and all orders for this visit:    1. Hypoglycemia (Primary)  Assessment & Plan:  Never had a low blood sugar  Noted to have low hemoglobin A1c  Never been diagnosed with diabetes, prediabetes or gestational diabetes  Does not " check blood sugars at home  Since she made dietary modification has not had any episodes of lightheadedness or dizziness  Hypoglycemia signs/symptoms and treatment discussed with patient  Dexcom G7 sample provided and placed in office, will monitor her blood sugars and review blood sugars in 10 days  Check CBC and CMP        Orders:  -     Comprehensive Metabolic Panel  -     CBC & Differential; Future             Follow Up   Return in about 6 months (around 11/16/2024).  Patient was given instructions and counseling regarding her condition or for health maintenance advice. Please see specific information pulled into the AVS if appropriate.

## 2024-05-16 NOTE — ASSESSMENT & PLAN NOTE
Never had a low blood sugar  Noted to have low hemoglobin A1c  Never been diagnosed with diabetes, prediabetes or gestational diabetes  Does not check blood sugars at home  Since she made dietary modification has not had any episodes of lightheadedness or dizziness  Hypoglycemia signs/symptoms and treatment discussed with patient  Dexcom G7 sample provided and placed in office, will monitor her blood sugars and review blood sugars in 10 days  Check CBC and CMP

## 2024-05-17 ENCOUNTER — PATIENT ROUNDING (BHMG ONLY) (OUTPATIENT)
Dept: ENDOCRINOLOGY | Age: 39
End: 2024-05-17
Payer: COMMERCIAL

## 2024-05-17 LAB
ALBUMIN SERPL-MCNC: 4.3 G/DL (ref 3.5–5.2)
ALBUMIN/GLOB SERPL: 1.7 G/DL
ALP SERPL-CCNC: 61 U/L (ref 39–117)
ALT SERPL-CCNC: 15 U/L (ref 1–33)
AST SERPL-CCNC: 15 U/L (ref 1–32)
BILIRUB SERPL-MCNC: 0.3 MG/DL (ref 0–1.2)
BUN SERPL-MCNC: 11 MG/DL (ref 6–20)
BUN/CREAT SERPL: 15.7 (ref 7–25)
CALCIUM SERPL-MCNC: 9.3 MG/DL (ref 8.6–10.5)
CHLORIDE SERPL-SCNC: 105 MMOL/L (ref 98–107)
CO2 SERPL-SCNC: 25.5 MMOL/L (ref 22–29)
CREAT SERPL-MCNC: 0.7 MG/DL (ref 0.57–1)
EGFRCR SERPLBLD CKD-EPI 2021: 113 ML/MIN/1.73
GLOBULIN SER CALC-MCNC: 2.5 GM/DL
GLUCOSE SERPL-MCNC: 82 MG/DL (ref 65–99)
POTASSIUM SERPL-SCNC: 4.3 MMOL/L (ref 3.5–5.2)
PROT SERPL-MCNC: 6.8 G/DL (ref 6–8.5)
SODIUM SERPL-SCNC: 142 MMOL/L (ref 136–145)

## 2024-05-21 ENCOUNTER — TELEPHONE (OUTPATIENT)
Dept: ENDOCRINOLOGY | Age: 39
End: 2024-05-21

## 2024-05-21 NOTE — TELEPHONE ENCOUNTER
Caller: CALI SHAH    Relationship to patient: SELF    Best call back number: [phone]    Patient is needing: PATIENT SAID THE DOCTOR PUT A BLOOD SUGAR MONITORING POD ON THE BACK OF HER ARM LAST THURSDAY. IT FELL OFF LAST NIGHT IN HER SLEEP. SHE WANTED TO LET THE DOCTOR KNOW, IF SHE NEEDS TO COME IN AND GET ANOTHER ONE? SHE KNOWS THE DOCTOR WAS MONITORING IT AND WANTED HER TO WEAR IT. SHE WOULD LIKE A CALL BACK ON THIS, PLEASE ADVISE.

## 2024-05-24 ENCOUNTER — CLINICAL SUPPORT (OUTPATIENT)
Dept: ENDOCRINOLOGY | Age: 39
End: 2024-05-24
Payer: COMMERCIAL

## 2024-05-24 DIAGNOSIS — E16.2 HYPOGLYCEMIA: Primary | ICD-10-CM

## 2024-05-24 NOTE — PATIENT INSTRUCTIONS
"Dexcom G7 Patient Information     Sensors:  Application process: clean area with alcohol beforehand. Unscrew the lid on the applicator to expose the sensor. Press the applicator against the skin on the back of the upper arm and press the button to apply the sensor.   Must be changed every 10 days. One month supply will include three sensors.  Sensor is placed on the back of upper arm or upper buttocks (age 2-6 only). Sensor placement is important and you will want to change your insertion site with each sensor.    Using the same site too often might not allow the skin to heal, causing scarring or skin irritation.  Choose a site:  At least 3 inches from any insulin pump infusion set or injection site  Away from waistbands, scarring, tattoos, irritation, and bones  Unlikely to be bumped, pushed, or laid on while sleeping  Once the sensor is placed, press \"start new sensor\" in raji on smart phone or on .   Each new sensor will take 1 hour to warm up and begin providing blood sugar readings.      /Raji on Smart Phone:  Will be used to alert you when blood sugar is low or high and to obtain blood sugar readings.     [email or web address]       "

## 2024-05-24 NOTE — PROGRESS NOTES
CGM Placement and Training    5/24/2024    Patient: Sandra Bolaños  YOB: 1985    Provider: Mahrokh Nokhbehzaeim    Sandra Bolaños presents today for initial education and set-up of CGM including sensor placement, hookup, calibration of monitor, and patient training as ordered by Mahrokh Nokhbehzaeim.     Device Name: Dexcom G7 - This was patient provided equipment.     Sensor Placement, Hookup, and Calibration: The Dexcom G7 sensor was placed on the back side of the left upper arm. The sensor/transmitter/ were connected and calibrated appropriately.    Education/Training Provided: Education/training was provided in video format and face-to-face on the following topics:    [x] Sensor site selection, rotation, and application  [x] Connecting the sensor/transmitter/  [x] Taping/securing the sensor/transmitter  [x] Differences between interstitial glucose readings and fingerstick blood glucose readings  [x] Calibrating the device including timing, frequency, and importance of accurate meter/fingerstick technique  [x] Understanding CGM data and trends  [x] Basic troubleshooting tips  [x] Using a traditional blood glucose monitor to double-check readings  [x] Preventing overcorrection of high glucose  [x] Removal and disposal of the device  [x] Possible interference of products, such as acetaminophen, hydroxyurea, salicylic acid, and vitamin C    Technique demonstration was provided when needed. Patient instructions on the device were also attached to the After Visit Summary/MyChart for patient reference.    All questions and concerns were addressed and the patient verbalized understanding of the education and materials provided. She will call the office with any additional questions.    Follow-Up: She will return to the office in 2 weeks with the data  for printing of the captured data recordings and removal of sensor device (if needed).    Virgilio Jade,  MA  5/24/2024  13:39 EDT

## 2024-06-03 ENCOUNTER — TELEPHONE (OUTPATIENT)
Dept: ENDOCRINOLOGY | Age: 39
End: 2024-06-03
Payer: COMMERCIAL

## 2024-06-03 ENCOUNTER — TREATMENT (OUTPATIENT)
Dept: ENDOCRINOLOGY | Age: 39
End: 2024-06-03
Payer: COMMERCIAL

## 2024-06-03 NOTE — TELEPHONE ENCOUNTER
Pt called in to let us know that her sensor was , I went ahead and attached the Dexcom report to the treatment encounter. Pt is wondering if  is going to want her to keep using the Dexcom if so she is needing the sensors sent to the pharmacy.

## 2024-06-18 ENCOUNTER — TELEPHONE (OUTPATIENT)
Dept: FAMILY MEDICINE CLINIC | Age: 39
End: 2024-06-18
Payer: COMMERCIAL

## 2024-08-30 ENCOUNTER — LAB (OUTPATIENT)
Dept: LAB | Facility: HOSPITAL | Age: 39
End: 2024-08-30
Payer: COMMERCIAL

## 2024-08-30 DIAGNOSIS — R42 DIZZINESS: ICD-10-CM

## 2024-08-30 PROCEDURE — 36415 COLL VENOUS BLD VENIPUNCTURE: CPT

## 2024-08-30 PROCEDURE — 85025 COMPLETE CBC W/AUTO DIFF WBC: CPT | Performed by: STUDENT IN AN ORGANIZED HEALTH CARE EDUCATION/TRAINING PROGRAM

## 2024-08-30 PROCEDURE — 82607 VITAMIN B-12: CPT

## 2024-08-31 LAB — VIT B12 BLD-MCNC: 309 PG/ML (ref 211–946)

## 2024-09-19 ENCOUNTER — TELEPHONE (OUTPATIENT)
Dept: ENDOCRINOLOGY | Age: 39
End: 2024-09-19
Payer: COMMERCIAL

## 2024-12-06 ENCOUNTER — TELEPHONE (OUTPATIENT)
Dept: FAMILY MEDICINE CLINIC | Age: 39
End: 2024-12-06
Payer: COMMERCIAL

## 2024-12-06 NOTE — TELEPHONE ENCOUNTER
----- Message from Lainey LEAL sent at 12/6/2024  9:09 AM EST -----      ----- Message -----  From: SYSTEM  Sent: 12/6/2024  12:51 AM EST  [facility]

## 2024-12-06 NOTE — TELEPHONE ENCOUNTER
Called and left patient a voicemail regarding getting mammogram scheduled. I left the number for her to call to get it scheduled.